# Patient Record
Sex: FEMALE | Race: WHITE | NOT HISPANIC OR LATINO | Employment: FULL TIME | URBAN - METROPOLITAN AREA
[De-identification: names, ages, dates, MRNs, and addresses within clinical notes are randomized per-mention and may not be internally consistent; named-entity substitution may affect disease eponyms.]

---

## 2017-11-01 ENCOUNTER — RECORDS - HEALTHEAST (OUTPATIENT)
Dept: LAB | Facility: CLINIC | Age: 52
End: 2017-11-01

## 2017-11-01 LAB
HBV SURFACE AG SERPL QL IA: NEGATIVE
HCV AB SERPL QL IA: NEGATIVE
HIV 1+2 AB+HIV1 P24 AG SERPL QL IA: NEGATIVE

## 2020-01-20 ENCOUNTER — MEDICAL CORRESPONDENCE (OUTPATIENT)
Dept: HEALTH INFORMATION MANAGEMENT | Facility: CLINIC | Age: 55
End: 2020-01-20

## 2020-03-12 ENCOUNTER — TELEPHONE (OUTPATIENT)
Dept: GASTROENTEROLOGY | Facility: CLINIC | Age: 55
End: 2020-03-12

## 2020-03-13 ENCOUNTER — TELEPHONE (OUTPATIENT)
Dept: GASTROENTEROLOGY | Facility: CLINIC | Age: 55
End: 2020-03-13

## 2020-03-16 ENCOUNTER — TELEPHONE (OUTPATIENT)
Dept: GASTROENTEROLOGY | Facility: CLINIC | Age: 55
End: 2020-03-16

## 2020-12-01 ENCOUNTER — RECORDS - HEALTHEAST (OUTPATIENT)
Dept: LAB | Facility: CLINIC | Age: 55
End: 2020-12-01

## 2020-12-01 LAB
SARS-COV-2 PCR COMMENT: NORMAL
SARS-COV-2 RNA SPEC QL NAA+PROBE: NEGATIVE
SARS-COV-2 VIRUS SPECIMEN SOURCE: NORMAL

## 2021-05-30 ENCOUNTER — HEALTH MAINTENANCE LETTER (OUTPATIENT)
Age: 56
End: 2021-05-30

## 2021-09-19 ENCOUNTER — HEALTH MAINTENANCE LETTER (OUTPATIENT)
Age: 56
End: 2021-09-19

## 2022-05-13 ENCOUNTER — APPOINTMENT (OUTPATIENT)
Dept: RADIOLOGY | Facility: HOSPITAL | Age: 57
End: 2022-05-13
Attending: EMERGENCY MEDICINE
Payer: COMMERCIAL

## 2022-05-13 ENCOUNTER — HOSPITAL ENCOUNTER (EMERGENCY)
Facility: HOSPITAL | Age: 57
Discharge: HOME | End: 2022-05-13
Attending: EMERGENCY MEDICINE | Admitting: EMERGENCY MEDICINE
Payer: COMMERCIAL

## 2022-05-13 VITALS
BODY MASS INDEX: 39.87 KG/M2 | WEIGHT: 225 LBS | SYSTOLIC BLOOD PRESSURE: 147 MMHG | HEIGHT: 63 IN | HEART RATE: 100 BPM | RESPIRATION RATE: 18 BRPM | DIASTOLIC BLOOD PRESSURE: 78 MMHG | TEMPERATURE: 98.8 F | OXYGEN SATURATION: 95 %

## 2022-05-13 DIAGNOSIS — R06.02 SHORTNESS OF BREATH: ICD-10-CM

## 2022-05-13 PROBLEM — L30.9 ECZEMA: Status: ACTIVE | Noted: 2022-05-13

## 2022-05-13 PROBLEM — E78.5 HYPERLIPIDEMIA: Status: ACTIVE | Noted: 2022-05-13

## 2022-05-13 PROBLEM — G47.00 INSOMNIA: Status: ACTIVE | Noted: 2022-05-13

## 2022-05-13 PROBLEM — I10 HTN (HYPERTENSION): Status: ACTIVE | Noted: 2022-05-03

## 2022-05-13 PROBLEM — K21.9 GERD (GASTROESOPHAGEAL REFLUX DISEASE): Status: ACTIVE | Noted: 2022-05-13

## 2022-05-13 PROBLEM — N76.1 SUBACUTE VAGINITIS: Status: ACTIVE | Noted: 2022-05-13

## 2022-05-13 PROBLEM — F41.9 ANXIETY: Status: ACTIVE | Noted: 2022-05-13

## 2022-05-13 LAB
ANION GAP SERPL CALCULATED.3IONS-SCNC: 11 MMOL/L (ref 5–18)
BASOPHILS # BLD AUTO: 0 10E3/UL (ref 0–0.2)
BASOPHILS NFR BLD AUTO: 0 %
BNP SERPL-MCNC: 22 PG/ML (ref 0–89)
BUN SERPL-MCNC: 19 MG/DL (ref 8–22)
CALCIUM SERPL-MCNC: 9.3 MG/DL (ref 8.5–10.5)
CHLORIDE BLD-SCNC: 102 MMOL/L (ref 98–107)
CO2 SERPL-SCNC: 26 MMOL/L (ref 22–31)
CREAT SERPL-MCNC: 0.73 MG/DL (ref 0.6–1.1)
D DIMER PPP FEU-MCNC: 0.4 UG/ML FEU (ref 0–0.5)
EOSINOPHIL # BLD AUTO: 0.2 10E3/UL (ref 0–0.7)
EOSINOPHIL NFR BLD AUTO: 2 %
ERYTHROCYTE [DISTWIDTH] IN BLOOD BY AUTOMATED COUNT: 13.4 % (ref 10–15)
FLUAV RNA SPEC QL NAA+PROBE: NEGATIVE
FLUBV RNA RESP QL NAA+PROBE: NEGATIVE
GFR SERPL CREATININE-BSD FRML MDRD: >90 ML/MIN/1.73M2
GLUCOSE BLD-MCNC: 97 MG/DL (ref 70–125)
HCT VFR BLD AUTO: 41.8 % (ref 35–47)
HGB BLD-MCNC: 13.7 G/DL (ref 11.7–15.7)
IMM GRANULOCYTES # BLD: 0 10E3/UL
IMM GRANULOCYTES NFR BLD: 0 %
LYMPHOCYTES # BLD AUTO: 1.4 10E3/UL (ref 0.8–5.3)
LYMPHOCYTES NFR BLD AUTO: 22 %
MCH RBC QN AUTO: 28.7 PG (ref 26.5–33)
MCHC RBC AUTO-ENTMCNC: 32.8 G/DL (ref 31.5–36.5)
MCV RBC AUTO: 87 FL (ref 78–100)
MONOCYTES # BLD AUTO: 0.6 10E3/UL (ref 0–1.3)
MONOCYTES NFR BLD AUTO: 9 %
NEUTROPHILS # BLD AUTO: 4.2 10E3/UL (ref 1.6–8.3)
NEUTROPHILS NFR BLD AUTO: 67 %
NRBC # BLD AUTO: 0 10E3/UL
NRBC BLD AUTO-RTO: 0 /100
PLATELET # BLD AUTO: 227 10E3/UL (ref 150–450)
POTASSIUM BLD-SCNC: 4.5 MMOL/L (ref 3.5–5)
RBC # BLD AUTO: 4.78 10E6/UL (ref 3.8–5.2)
SARS-COV-2 RNA RESP QL NAA+PROBE: NEGATIVE
SODIUM SERPL-SCNC: 139 MMOL/L (ref 136–145)
TROPONIN I SERPL-MCNC: <0.01 NG/ML (ref 0–0.29)
WBC # BLD AUTO: 6.4 10E3/UL (ref 4–11)

## 2022-05-13 PROCEDURE — 87636 SARSCOV2 & INF A&B AMP PRB: CPT | Performed by: EMERGENCY MEDICINE

## 2022-05-13 PROCEDURE — 99285 EMERGENCY DEPT VISIT HI MDM: CPT | Mod: 25

## 2022-05-13 PROCEDURE — 84484 ASSAY OF TROPONIN QUANT: CPT | Performed by: EMERGENCY MEDICINE

## 2022-05-13 PROCEDURE — 36415 COLL VENOUS BLD VENIPUNCTURE: CPT | Performed by: EMERGENCY MEDICINE

## 2022-05-13 PROCEDURE — 71046 X-RAY EXAM CHEST 2 VIEWS: CPT

## 2022-05-13 PROCEDURE — C9803 HOPD COVID-19 SPEC COLLECT: HCPCS

## 2022-05-13 PROCEDURE — 85025 COMPLETE CBC W/AUTO DIFF WBC: CPT | Performed by: EMERGENCY MEDICINE

## 2022-05-13 PROCEDURE — 250N000012 HC RX MED GY IP 250 OP 636 PS 637: Performed by: EMERGENCY MEDICINE

## 2022-05-13 PROCEDURE — 93005 ELECTROCARDIOGRAM TRACING: CPT | Performed by: EMERGENCY MEDICINE

## 2022-05-13 PROCEDURE — 85379 FIBRIN DEGRADATION QUANT: CPT | Performed by: EMERGENCY MEDICINE

## 2022-05-13 PROCEDURE — 83880 ASSAY OF NATRIURETIC PEPTIDE: CPT | Performed by: EMERGENCY MEDICINE

## 2022-05-13 PROCEDURE — 80048 BASIC METABOLIC PNL TOTAL CA: CPT | Performed by: EMERGENCY MEDICINE

## 2022-05-13 RX ORDER — PREDNISONE 20 MG/1
TABLET ORAL
Qty: 4 TABLET | Refills: 0 | Status: SHIPPED | OUTPATIENT
Start: 2022-05-13

## 2022-05-13 RX ORDER — PREDNISONE 20 MG/1
20 TABLET ORAL ONCE
Status: COMPLETED | OUTPATIENT
Start: 2022-05-13 | End: 2022-05-13

## 2022-05-13 RX ADMIN — PREDNISONE 20 MG: 20 TABLET ORAL at 22:00

## 2022-05-13 NOTE — ED NOTES
"ED Provider In Triage Note  [facility]  Encounter Date: May 13, 2022    Chief Complaint   Patient presents with     Shortness of Breath       Brief HPI:   Alicia Purvis is a 56 year old female presenting to the Emergency Department with a chief complaint of cough and shortness of breath with sore throat, worse with exertion.    Brief Physical Exam:  BP (!) 147/78   Pulse 100   Temp 98.8  F (37.1  C) (Oral)   Resp 18   Ht 1.6 m (5' 3\")   Wt 102.1 kg (225 lb)   SpO2 95%   BMI 39.86 kg/m    General: Non-toxic appearing  HEENT: Atraumatic  Resp: No respiratory distress, breath sounds equal bilaterally.  Abdomen: Non-peritoneal  Neuro: Alert, oriented, answers questions appropriately  Psych: Behavior appropriate      Plan Initiated in Triage:  Orders Placed This Encounter     XR Chest 2 Views     Basic metabolic panel     Troponin I     B-Type Natriuretic Peptide ([facility] Only)     Symptomatic; Unknown Influenza A/B & SARS-CoV2 (COVID-19) Virus PCR Multiplex       PIT Dispo:   Return to lobby while awaiting workup and ED bed availability    Mishel Lunsford MD on 5/13/2022 at 6:58 PM    Patient was evaluated by the Physician in Triage due to a limitation of available rooms in the Emergency Department. A plan of care was discussed based on the information obtained on the initial evaluation and patient was consuled to return back to the Emergency Department lobby after this initial evalutaiton until results were obtained or a room became available in the Emergency Department. Patient was counseled not to leave prior to receiving the results of their workup.     Mishel Lunsford MD  [facility] EMERGENCY DEPARTMENT  [address] [place] [id number]  [phone]     Mishel Lunsford MD  05/13/22 [id number]    "

## 2022-05-13 NOTE — ED TRIAGE NOTES
C/o shortness of breath and cough that has been progressing over the last forty-eight hours. Exertional dyspnea and lightheadedness also noted.     Tried her inhaler this morning with no reduction in symptoms.     Patient states she started Lisinopril a week ago.

## 2022-05-14 NOTE — DISCHARGE INSTRUCTIONS
Please follow-up with your Primary Care Provider early this upcoming week for a recheck; call to arrange appointment.    Return to the ER for worsening symptoms, worsening shortness of breath, if you develop chest pain, if you pass out or feel that you might, persistent vomiting, fever or other concerns.    Complete the prednisone burst, even if you are feeling better.     I also recommend holding your lisinopril and discussing with your doctor possibly taking a different medication for your hypertension.

## 2022-05-14 NOTE — ED PROVIDER NOTES
Emergency Department Encounter     Evaluation Date & Time:   No admission date for patient encounter.    CHIEF COMPLAINT:  Shortness of Breath      Triage Note:C/o shortness of breath and cough that has been progressing over the last forty-eight hours. Exertional dyspnea and lightheadedness also noted.     Tried her inhaler this morning with no reduction in symptoms.     Patient states she started Lisinopril a week ago.           Impression and Plan       FINAL IMPRESSION:    ICD-10-CM    1. Shortness of breath  R06.02          ED COURSE & MEDICAL DECISION MAKIN:31 PM Introduced myself to the patient, obtained history of present illness, and performed initial physical exam at this time. PPE: Provider wore gloves, N95 mask, eye protection, and paper mask.   9:42 PM Discussed discharge with the patient. She is agreeable with this plan.    56 year old female, history of HTN, HLD, GERD and anxiety, who presents for evaluation of exertional SOB x 2 days. No chest pain, however reports that her lungs feel as though they are burning. She does report cough, however this started ~1 week ago after starting lisinopril; no associated URI symptoms or fevers.     On exam, she has somewhat diminished, but symmetrical, breath sounds without distress and lungs are clear.     Cardiac etiology considered.  EKG performed and demonstrated NSR with septal Q waves and no ST-T wave elevation consistent with ACS or pericarditis.  Troponin WNL (<0.01); a single, normal troponin is reassuring that symptoms are not secondary to ACS given that they have been ongoing for 2 days and I do not think serial troponin testing is indicated.    PE considered.  Patient low probability for which a D-dimer was checked and WNL (0.40).  Risks of CTA chest felt to outweigh benefit.  CXR performed and was negative.    No clinical, radiographic or laboratory (BNP 22) evidence of acute CHF.    Influenza and COVID test negative.    Consider bronchospasm.   Patient does have her Xopenex inhaler and, given that she is in triage, no neb treatment was administered.  Will treat with a short course of prednisone.      Patient ambulated on a pulse oximeter and oxygen saturations remained at 93% and higher; she denied significant symptoms with ambulation and is comfortable with discharge to home.    Patient discharged with follow-up with her primary care provider early this upcoming week.  She was given a prescription for a prednisone burst; given the first dose po in the ED. Patient also was advised to hold her lisinopril until further discussion with her primary care provider should this be contributing to her symptoms.  Return precautions provided.  Patient stable throughout ED course.      At the conclusion of the encounter I discussed the results of all the tests and the disposition. The questions were answered. The patient acknowledged understanding and was agreeable with the care plan.        MEDICATIONS GIVEN IN THE EMERGENCY DEPARTMENT:  Medications   predniSONE (DELTASONE) tablet 20 mg (20 mg Oral Given 5/13/22 2200)       NEW PRESCRIPTIONS STARTED AT TODAY'S ED VISIT:  Discharge Medication List as of 5/13/2022 10:33 PM      START taking these medications    Details   predniSONE (DELTASONE) 20 MG tablet Take one tablet daily for the next 4 days, Disp-4 tablet, R-0, Local Print             HPI     The history is provided by the patient.      Alicia Purvis is a 56 year old female, history of hypertension, hyperlipidemia, GERD and anxiety, who presents to this ED for evaluation of shortness of breath.    The patient started taking lisinopril last Friday (1 week ago). She notes that she developed a cough shortly after. Over the last 48 hours, she has had gradually worsening shortness of breath. This morning she felt so short of breath that she used her Xopenex inhaler, but notes that this did not help to alleviate her symptoms at all. Her shortness of breath is  "worse with exertion. She endorses associated lightheadedness, fatigue and generalized weakness. She denies chest pain, however reports that her lungs feel like they are burning. No orthopnea. She denies URI symptoms, fevers.     She has otherwise been in her usual state of health and denies abdominal pain, N/V/D or other concerns.    REVIEW OF SYSTEMS:  All other systems reviewed and are negative.      Medical History     No past medical history on file.    No past surgical history on file.    No family history on file.         predniSONE (DELTASONE) 20 MG tablet        Physical Exam     First Vitals:  Patient Vitals for the past 24 hrs:   BP Temp Temp src Pulse Resp SpO2 Height Weight   05/13/22 1850 (!) 147/78 98.8  F (37.1  C) Oral 100 18 95 % 1.6 m (5' 3\") 102.1 kg (225 lb)       PHYSICAL EXAM:   Physical Exam    GENERAL: Awake, alert.  In no acute distress.   HEENT: Normocephalic, atraumatic. Pupils equal, round and reactive. Conjunctiva normal.  NECK: No stridor.  PULMONARY: Somewhat diminished, but symmetrical, breath sounds without distress.  Lungs clear to auscultation bilaterally without wheezes, rhonchi or rales.  CARDIO: Regular rate and rhythm.  No significant murmur, rub or gallop.    ABDOMINAL: Abdomen soft, non-distended and non-tender to palpation.    EXTREMITIES: No lower extremity swelling or edema.      NEURO: Alert and oriented to person, place and time.  Cranial nerves grossly intact.  No focal motor deficit.  PSYCH: Normal mood and affect.  SKIN: No rashes.     Results     LAB:  All pertinent labs reviewed and interpreted  Labs Ordered and Resulted from Time of ED Arrival to Time of ED Departure   BASIC METABOLIC PANEL - Normal       Result Value    Sodium 139      Potassium 4.5      Chloride 102      Carbon Dioxide (CO2) 26      Anion Gap 11      Urea Nitrogen 19      Creatinine 0.73      Calcium 9.3      Glucose 97      GFR Estimate >90     TROPONIN I - Normal    Troponin I <0.01     B-TYPE " NATRIURETIC PEPTIDE ([facility] ONLY) - Normal    BNP 22     INFLUENZA A/B & SARS-COV2 PCR MULTIPLEX - Normal    Influenza A PCR Negative      Influenza B PCR Negative      SARS CoV2 PCR Negative     D DIMER QUANTITATIVE - Normal    D-Dimer Quantitative 0.40     CBC WITH PLATELETS AND DIFFERENTIAL    WBC Count 6.4      RBC Count 4.78      Hemoglobin 13.7      Hematocrit 41.8      MCV 87      MCH 28.7      MCHC 32.8      RDW 13.4      Platelet Count 227      % Neutrophils 67      % Lymphocytes 22      % Monocytes 9      % Eosinophils 2      % Basophils 0      % Immature Granulocytes 0      NRBCs per 100 WBC 0      Absolute Neutrophils 4.2      Absolute Lymphocytes 1.4      Absolute Monocytes 0.6      Absolute Eosinophils 0.2      Absolute Basophils 0.0      Absolute Immature Granulocytes 0.0      Absolute NRBCs 0.0         RADIOLOGY:  XR Chest 2 Views   Final Result   IMPRESSION: Negative chest.        EC2022, 19:21; NSR with rate of 85 bpm; normal intervals; normal conduction; septal Q waves with no ST-T wave changes consistent with ACS or pericarditis; no previous EKG available for comparison    EKG independently reviewed and interpreted by Gwen Martin MD      I, Kennedi Hernandez, am serving as a scribe to document services personally performed by Gwen Martin MD based on my observation and the provider's statements to me. I, Gwen Martin MD attest that Kennedi Hernandez is acting in a scribe capacity, has observed my performance of the services and has documented them in accordance with my direction.    Gwen Martin MD  Emergency Medicine  [facility] EMERGENCY DEPARTMENT           Gwen Martin MD  [phone]

## 2022-05-14 NOTE — ED NOTES
02 stayed between 93 to 95 while walking. Pt tolerated well. Pt stated still had little shortness of breath but did better then early today.

## 2022-05-15 LAB
ATRIAL RATE - MUSE: 85 BPM
DIASTOLIC BLOOD PRESSURE - MUSE: NORMAL MMHG
INTERPRETATION ECG - MUSE: NORMAL
P AXIS - MUSE: 60 DEGREES
PR INTERVAL - MUSE: 158 MS
QRS DURATION - MUSE: 78 MS
QT - MUSE: 368 MS
QTC - MUSE: 437 MS
R AXIS - MUSE: 36 DEGREES
SYSTOLIC BLOOD PRESSURE - MUSE: NORMAL MMHG
T AXIS - MUSE: 48 DEGREES
VENTRICULAR RATE- MUSE: 85 BPM

## 2022-06-26 ENCOUNTER — HEALTH MAINTENANCE LETTER (OUTPATIENT)
Age: 57
End: 2022-06-26

## 2022-11-21 ENCOUNTER — HEALTH MAINTENANCE LETTER (OUTPATIENT)
Age: 57
End: 2022-11-21

## 2023-07-09 ENCOUNTER — HEALTH MAINTENANCE LETTER (OUTPATIENT)
Age: 58
End: 2023-07-09

## 2024-09-01 ENCOUNTER — HEALTH MAINTENANCE LETTER (OUTPATIENT)
Age: 59
End: 2024-09-01